# Patient Record
Sex: FEMALE | Race: WHITE
[De-identification: names, ages, dates, MRNs, and addresses within clinical notes are randomized per-mention and may not be internally consistent; named-entity substitution may affect disease eponyms.]

---

## 2021-07-28 ENCOUNTER — HOSPITAL ENCOUNTER (OUTPATIENT)
Dept: HOSPITAL 47 - LABWHC1 | Age: 31
Discharge: HOME | End: 2021-07-28
Attending: FAMILY MEDICINE
Payer: COMMERCIAL

## 2021-07-28 DIAGNOSIS — Z3A.00: ICD-10-CM

## 2021-07-28 DIAGNOSIS — O09.90: Primary | ICD-10-CM

## 2021-07-28 LAB
BASOPHILS # BLD AUTO: 0.04 X 10*3/UL (ref 0–0.1)
BASOPHILS NFR BLD AUTO: 0.3 %
EOSINOPHIL # BLD AUTO: 0.1 X 10*3/UL (ref 0.04–0.35)
EOSINOPHIL NFR BLD AUTO: 0.8 %
ERYTHROCYTE [DISTWIDTH] IN BLOOD BY AUTOMATED COUNT: 5 X 10*6/UL (ref 4.1–5.2)
ERYTHROCYTE [DISTWIDTH] IN BLOOD: 13.1 % (ref 11.5–14.5)
HCT VFR BLD AUTO: 44.8 % (ref 37.2–46.3)
HGB BLD-MCNC: 14.7 G/DL (ref 12–15)
LYMPHOCYTES # SPEC AUTO: 3.66 X 10*3/UL (ref 0.9–5)
LYMPHOCYTES NFR SPEC AUTO: 30.2 %
MCH RBC QN AUTO: 29.4 PG (ref 27–32)
MCHC RBC AUTO-ENTMCNC: 32.8 G/DL (ref 32–37)
MCV RBC AUTO: 89.6 FL (ref 80–97)
MONOCYTES # BLD AUTO: 0.76 X 10*3/UL (ref 0.2–1)
MONOCYTES NFR BLD AUTO: 6.3 %
NEUTROPHILS # BLD AUTO: 7.52 X 10*3/UL (ref 1.8–7.7)
NEUTROPHILS NFR BLD AUTO: 62.2 %
PLATELET # BLD AUTO: 256 X 10*3/UL (ref 140–440)
WBC # BLD AUTO: 12.11 X 10*3/UL (ref 4.5–10)

## 2021-07-28 PROCEDURE — 85025 COMPLETE CBC W/AUTO DIFF WBC: CPT

## 2021-07-28 PROCEDURE — 86592 SYPHILIS TEST NON-TREP QUAL: CPT

## 2021-07-28 PROCEDURE — 36415 COLL VENOUS BLD VENIPUNCTURE: CPT

## 2021-07-28 PROCEDURE — 84702 CHORIONIC GONADOTROPIN TEST: CPT

## 2021-07-28 PROCEDURE — 86780 TREPONEMA PALLIDUM: CPT

## 2021-07-28 PROCEDURE — 80053 COMPREHEN METABOLIC PANEL: CPT

## 2021-07-28 PROCEDURE — 87340 HEPATITIS B SURFACE AG IA: CPT

## 2021-07-29 LAB
ALBUMIN SERPL-MCNC: 4.5 G/DL (ref 3.8–4.9)
ALBUMIN/GLOB SERPL: 1.55 G/DL (ref 1.6–3.17)
ALP SERPL-CCNC: 63 U/L (ref 41–126)
ALT SERPL-CCNC: 26 U/L (ref 8–44)
ANION GAP SERPL CALC-SCNC: 12.6 MMOL/L (ref 4–12)
AST SERPL-CCNC: 21 U/L (ref 13–35)
BUN SERPL-SCNC: 11 MG/DL (ref 9–27)
BUN/CREAT SERPL: 15.71 RATIO (ref 12–20)
CALCIUM SPEC-MCNC: 9.6 MG/DL (ref 8.7–10.3)
CHLORIDE SERPL-SCNC: 104 MMOL/L (ref 96–109)
CO2 SERPL-SCNC: 20.4 MMOL/L (ref 21.6–31.8)
GLOBULIN SER CALC-MCNC: 2.9 G/DL (ref 1.6–3.3)
GLUCOSE SERPL-MCNC: 109 MG/DL (ref 70–110)
HCG SERPL-MCNC: (no result) MIU/ML
POTASSIUM SERPL-SCNC: 4.2 MMOL/L (ref 3.5–5.5)
PROT SERPL-MCNC: 7.4 G/DL (ref 6.2–8.2)
SODIUM SERPL-SCNC: 137 MMOL/L (ref 135–145)

## 2022-07-13 ENCOUNTER — HOSPITAL ENCOUNTER (EMERGENCY)
Dept: HOSPITAL 47 - EC | Age: 32
Discharge: HOME | End: 2022-07-13
Payer: COMMERCIAL

## 2022-07-13 VITALS
DIASTOLIC BLOOD PRESSURE: 66 MMHG | RESPIRATION RATE: 18 BRPM | TEMPERATURE: 98.2 F | SYSTOLIC BLOOD PRESSURE: 121 MMHG | HEART RATE: 75 BPM

## 2022-07-13 DIAGNOSIS — F11.20: Primary | ICD-10-CM

## 2022-07-13 DIAGNOSIS — F17.200: ICD-10-CM

## 2022-07-13 LAB
ANION GAP SERPL CALC-SCNC: 6 MMOL/L
BASOPHILS # BLD AUTO: 0 K/UL (ref 0–0.2)
BASOPHILS NFR BLD AUTO: 0 %
BUN SERPL-SCNC: 6 MG/DL (ref 7–17)
CALCIUM SPEC-MCNC: 8.8 MG/DL (ref 8.4–10.2)
CHLORIDE SERPL-SCNC: 102 MMOL/L (ref 98–107)
CO2 SERPL-SCNC: 26 MMOL/L (ref 22–30)
EOSINOPHIL # BLD AUTO: 0.1 K/UL (ref 0–0.7)
EOSINOPHIL NFR BLD AUTO: 2 %
ERYTHROCYTE [DISTWIDTH] IN BLOOD BY AUTOMATED COUNT: 3.77 M/UL (ref 3.8–5.4)
ERYTHROCYTE [DISTWIDTH] IN BLOOD: 12.7 % (ref 11.5–15.5)
GLUCOSE BLD-MCNC: 65 MG/DL (ref 70–110)
GLUCOSE BLD-MCNC: 91 MG/DL (ref 70–110)
GLUCOSE SERPL-MCNC: 69 MG/DL (ref 74–99)
HCT VFR BLD AUTO: 33.4 % (ref 34–46)
HGB BLD-MCNC: 11.4 GM/DL (ref 11.4–16)
LYMPHOCYTES # SPEC AUTO: 2.1 K/UL (ref 1–4.8)
LYMPHOCYTES NFR SPEC AUTO: 25 %
MCH RBC QN AUTO: 30.3 PG (ref 25–35)
MCHC RBC AUTO-ENTMCNC: 34.2 G/DL (ref 31–37)
MCV RBC AUTO: 88.6 FL (ref 80–100)
MONOCYTES # BLD AUTO: 0.4 K/UL (ref 0–1)
MONOCYTES NFR BLD AUTO: 5 %
NEUTROPHILS # BLD AUTO: 5.3 K/UL (ref 1.3–7.7)
NEUTROPHILS NFR BLD AUTO: 66 %
PLATELET # BLD AUTO: 213 K/UL (ref 150–450)
POTASSIUM SERPL-SCNC: 2.9 MMOL/L (ref 3.5–5.1)
SODIUM SERPL-SCNC: 134 MMOL/L (ref 137–145)
WBC # BLD AUTO: 8.2 K/UL (ref 3.8–10.6)

## 2022-07-13 PROCEDURE — 36415 COLL VENOUS BLD VENIPUNCTURE: CPT

## 2022-07-13 PROCEDURE — 72125 CT NECK SPINE W/O DYE: CPT

## 2022-07-13 PROCEDURE — 80306 DRUG TEST PRSMV INSTRMNT: CPT

## 2022-07-13 PROCEDURE — 76805 OB US >/= 14 WKS SNGL FETUS: CPT

## 2022-07-13 PROCEDURE — 80048 BASIC METABOLIC PNL TOTAL CA: CPT

## 2022-07-13 PROCEDURE — 85025 COMPLETE CBC W/AUTO DIFF WBC: CPT

## 2022-07-13 PROCEDURE — 93005 ELECTROCARDIOGRAM TRACING: CPT

## 2022-07-13 PROCEDURE — 96360 HYDRATION IV INFUSION INIT: CPT

## 2022-07-13 PROCEDURE — 99285 EMERGENCY DEPT VISIT HI MDM: CPT

## 2022-07-13 PROCEDURE — 70450 CT HEAD/BRAIN W/O DYE: CPT

## 2022-07-13 NOTE — ED
General Adult HPI





- General


Chief complaint: Fall


Stated complaint: altered mental status


Time Seen by Provider: 07/13/22 17:17


Source: patient


Mode of arrival: EMS





- History of Present Illness


Initial comments: 


Dictation was produced using dragon dictation software. please excuse any 

grammatical, word or spelling errors. 











Chief Complaint: Patient 32-year-old pregnant female presents emergency 

department for syncope fall and concerns of opiate toxicity





History of Present Illness: Is a 32-year-old female she was brought in by EMS 

from [facility].  Patient was trying to check in for polysubstance abuse.  

While being checked in she was noted to the very sleepy.  She fell and struck 

her head per EMS.  She is placed in a c-collar brought to the emergency room.  

Patient did admit to EMS that she used heroin and crack today.  She states that 

she's been sleepy ever since being pregnant.  She states she is 26 weeks.  She 

denies any complaints at this time.  Patient is a poor historian.  She sleepy at

the bedside.








The ROS documented in this emergency department record has been reviewed and 

confirmed by me.  Those systems with pertinent positive or negative responses 

have been documented in the HPI.  All other systems are other negative and/or no

ncontributory.








PHYSICAL EXAM:


General Impression: Alert and oriented x3, not in acute distress, sleepy but 

arousable with voice


HEENT: Normocephalic atraumatic, extra-ocular movements intact, pupils equal and

reactive to light bilaterally, mucous membranes moist.


Cardiovascular: Heart regular rate and rhythm


Chest: Able to complete full sentences, no retractions, no tachypnea


Abdomen: abdomen soft, non-tender, non-distended, no organomegaly, gravid


Musculoskeletal: Pulses present and equal in all extremities, no peripheral 

edema


Motor:  no focal deficits noted


Neurological: CN II-XII grossly intact, no focal motor or sensory deficits noted


Skin: Intact with no visualized rashes


Psych: Normal affect and mood





ED course: 31 yo  female who is allegedly 26 weeks pregnant presents after synco

pe and fall.  Patient does have physical exam features to suggest opiate 

toxicity.  She is sleepy however she is not respiratory depressed.  Signs upon 

arrival are within acceptable limits.  Patient does not have any signs of 

traumatic injuries.


Patient became agitated and belligerent at approximately 8:00 PM.  She did not 

appear to be showing signs of opiate toxidrome.  CBC unremarkable.  Metabolic 

panel shows mild hyperglycemia with the level of 69.  Rest of metabolic panel is

negative.  Patient given dextrose and oral intake.  Repeat blood glucose levels 

are normal.  Computed tomography scan of the brain and C-spine shows no acute 

processes.  Patient c-collar was cleared.





EKG interpretation: Ventricular rate, sinus rhythm,.  140, care is 113, QTc 450.

No ME prolongation, no QTC prolongation, no ST or T-wave changes noted.    

Overall, this EKG is unremarkable





Fetal ultrasound was obtained showing no acute processes.  There is a single 

live intrauterine pregnancy.  5 weeks and 5 days per ultrasound measurements.  

Heart rate is 126.  No obstetrical complications this ultrasound.





Patient related bedside 9:45 PM found to be stable medical condition.  Patient 

is awake alert and well-appearing.  She states she does have a obstetrician and 

takes prenatal vitamins.  Patient is urged not to use any illicit substances 

during this pregnancy because but may put baby and herself at risk.  Patient 

understands.  She is advised to follow-up with her obstetrician.  Patient told 

to continue taking prenatal vitamins.











- Related Data


                                Home Medications











 Medication  Instructions  Recorded  Confirmed


 


Acetaminophen [Tylenol 8 Hour] 650 mg PO Q6H PRN 07/13/22 07/13/22


 


Folagent Dha Supplement 1 cap PO DAILY 07/13/22 07/13/22


 


Zofran (Unknown Dose) 1 dose PO Q6H PRN 07/13/22 07/13/22











                                    Allergies











Allergy/AdvReac Type Severity Reaction Status Date / Time


 


No Known Allergies Allergy   Verified 07/13/22 17:54














Review of Systems


ROS Statement: 


Those systems with pertinent positive or pertinent negative responses have been 

documented in the HPI.





ROS Other: All systems not noted in ROS Statement are negative.





Past Medical History


Past Medical History: No Reported History


History of Any Multi-Drug Resistant Organisms: None Reported


Past Surgical History: No Surgical Hx Reported


Past Psychological History: No Psychological Hx Reported


Smoking Status: Current every day smoker


Past Alcohol Use History: None Reported


Past Drug Use History: Heroin, IV Drug Use





Course


                                   Vital Signs











  07/13/22 07/13/22





  17:11 18:56


 


Temperature 98.1 F 


 


Pulse Rate 78 67


 


Respiratory 18 14





Rate  


 


Blood Pressure 108/66 112/75


 


O2 Sat by Pulse 98 99





Oximetry  














Medical Decision Making





- Lab Data


Result diagrams: 


                                 07/13/22 18:23





                                 07/13/22 18:23


                                   Lab Results











  07/13/22 07/13/22 07/13/22 Range/Units





  18:23 18:23 18:23 


 


WBC  8.2    (3.8-10.6)  k/uL


 


RBC  3.77 L    (3.80-5.40)  m/uL


 


Hgb  11.4    (11.4-16.0)  gm/dL


 


Hct  33.4 L    (34.0-46.0)  %


 


MCV  88.6    (80.0-100.0)  fL


 


MCH  30.3    (25.0-35.0)  pg


 


MCHC  34.2    (31.0-37.0)  g/dL


 


RDW  12.7    (11.5-15.5)  %


 


Plt Count  213    (150-450)  k/uL


 


MPV  7.5    


 


Neutrophils %  66    %


 


Lymphocytes %  25    %


 


Monocytes %  5    %


 


Eosinophils %  2    %


 


Basophils %  0    %


 


Neutrophils #  5.3    (1.3-7.7)  k/uL


 


Lymphocytes #  2.1    (1.0-4.8)  k/uL


 


Monocytes #  0.4    (0-1.0)  k/uL


 


Eosinophils #  0.1    (0-0.7)  k/uL


 


Basophils #  0.0    (0-0.2)  k/uL


 


Sodium   134 L   (137-145)  mmol/L


 


Potassium   2.9 L   (3.5-5.1)  mmol/L


 


Chloride   102   ()  mmol/L


 


Carbon Dioxide   26   (22-30)  mmol/L


 


Anion Gap   6   mmol/L


 


BUN   6 L   (7-17)  mg/dL


 


Creatinine   0.52   (0.52-1.04)  mg/dL


 


Est GFR (CKD-EPI)AfAm   >90   (>60 ml/min/1.73 sqM)  


 


Est GFR (CKD-EPI)NonAf   >90   (>60 ml/min/1.73 sqM)  


 


Glucose   69 L   (74-99)  mg/dL


 


Calcium   8.8   (8.4-10.2)  mg/dL


 


Urine Opiates Screen    Detected H  (NotDetected)  


 


Ur Oxycodone Screen    Not Detected  (NotDetected)  


 


Urine Methadone Screen    Not Detected  (NotDetected)  


 


Ur Propoxyphene Screen    Not Detected  (NotDetected)  


 


Ur Barbiturates Screen    Not Detected  (NotDetected)  


 


U Tricyclic Antidepress    Not Detected  (NotDetected)  


 


Ur Phencyclidine Scrn    Not Detected  (NotDetected)  


 


Ur Amphetamines Screen    Not Detected  (NotDetected)  


 


U Methamphetamines Scrn    Not Detected  (NotDetected)  


 


U Benzodiazepines Scrn    Not Detected  (NotDetected)  


 


Urine Cocaine Screen    Detected H  (NotDetected)  


 


U Marijuana (THC) Screen    Not Detected  (NotDetected)  














Disposition


Clinical Impression: 


 Opiate dependence





Disposition: HOME SELF-CARE


Condition: Fair


Instructions (If sedation given, give patient instructions):  Opioid Use 

Disorder (ED), Pregnancy at 23 to 26 Weeks (ED)


Additional Instructions: 


Follow-up with the obstetrician.


Is patient prescribed a controlled substance at d/c from ED?: No


Time of Disposition: 21:43

## 2022-07-13 NOTE — US
EXAMINATION TYPE: US OB >= 14 wk fetus

 

DATE OF EXAM: 7/13/2022

 

COMPARISON: None

 

CLINICAL HISTORY: pregnant, fall patient had a fall and hit head. patient used heroin this morning. T
echnical limitations, patient uncooperative, unable to hold still or answer questions 

 

TECHNIQUE:  Transabdominal (TA)

 

GESTATIONAL AGE / DATING

Physician Established: Not yet established 

Dates by LMP: LMP unknown 

Dates by First Scan: No previous this is first scan 

Dates by Current Scan: (26 weeks/0 days) 

** EDC: 10/19/22 

 

 

FETAL SURVEY

IUP:  Single

PLACENTA: Anterior - heterogenous    

PREVIA:  No Previa

** HUAN: 13.8 cm Normal

CERVICAL LENGTH (transabdominal: norm > 3.0cm): 3.2 cm

 

FETAL BIOMETRY

PRESENTATION:  Vertex

FETAL LIE:  Longitudinal

BPD: 6.4 cm

**  26 weeks / 0 days

HC: 23.5 cm

**  25 weeks / 4 days

AC: 22.0 cm

**  26 weeks / 3 days

FL: 4.7 cm

**  25 weeks / 5 days

ESTIMATED FETAL WEIGHT IN GRAMS:  881 grams

ESTIMATED FETAL WEIGHT IN LBS/OZ:  1 lbs. 15 oz. 

HC/AC: 1.07

FL/AC: 21%

HEART RATE:  126 bpm 

RHYTHM:  Normal

 

IMPRESSION:

Single live intrauterine pregnancy. The liver. The liver and the stomach

## 2022-07-13 NOTE — CT
EXAMINATION TYPE: CT brain cspine wo con

CT DLP: 1442.7 mGycm, Automated exposure control for dose reduction was used.

 

DATE OF EXAM: 7/13/2022 6:55 PM

 

COMPARISON: None.. 

 

CLINICAL INDICATION:Female, 32 years old with history of syncope, fall, pregnant; Syncope, fall, 26 w
eeks pregnant, pt shielded

 

TECHNIQUE: 

Brain: Multiple axial CT images of the brain were obtained without IV contrast. 

Cspine: Axial CT images from the skull base to the inferior aspect of T2 we obtained without intraven
ous contrast. Coronal and sagittal reformatted images were also reviewed. 

 

FINDINGS:

 

Brain:

Extra-axial spaces: No abnormal extra-axial fluid collections.

Ventricular system: Within normal limits

Cerebral parenchyma: No acute intraparenchymal hemorrhage or mass effect.  The gray-white junction is
 well differentiated. 

Cerebellum: Unremarkable.

Mass effect: No evidence of midline shift.

Intracranial vasculature: unremarkable

Soft tissues: Normal.

Calvarium/osseous structures: No depressed skull fracture. Ossified Cephalhematoma noted on the left.


Paranasal sinuses and mastoid air cells: Mild scattered mucosal thickening and or secretions.

Visualized orbits: Orbital contents are intact.

 

Cervical spine:

Fracture: None.

Osseous structures: Unremarkable 

Vertebral alignment: Within normal limits.

Spinal canal/Neural Foramina: No evidence of significant spinal canal narrowing. No evidence for sign
ificant neural foraminal stenosis.

Neck soft tissues: Prevertebral soft tissues are within normal limits.

Other: The airway is patent. The lung apices are clear.

 

IMPRESSION:

1.  No acute intracranial process.

2.  No evidence of cervical spine fracture.

## 2022-07-14 ENCOUNTER — HOSPITAL ENCOUNTER (INPATIENT)
Dept: HOSPITAL 47 - EC | Age: 32
LOS: 4 days | Discharge: HOME | DRG: 831 | End: 2022-07-18
Attending: INTERNAL MEDICINE | Admitting: INTERNAL MEDICINE
Payer: COMMERCIAL

## 2022-07-14 DIAGNOSIS — F31.9: ICD-10-CM

## 2022-07-14 DIAGNOSIS — L02.412: ICD-10-CM

## 2022-07-14 DIAGNOSIS — O99.342: ICD-10-CM

## 2022-07-14 DIAGNOSIS — O99.282: ICD-10-CM

## 2022-07-14 DIAGNOSIS — N30.00: ICD-10-CM

## 2022-07-14 DIAGNOSIS — O99.712: ICD-10-CM

## 2022-07-14 DIAGNOSIS — G92.8: ICD-10-CM

## 2022-07-14 DIAGNOSIS — O99.353: ICD-10-CM

## 2022-07-14 DIAGNOSIS — O99.322: Primary | ICD-10-CM

## 2022-07-14 DIAGNOSIS — Z56.0: ICD-10-CM

## 2022-07-14 DIAGNOSIS — Z3A.26: ICD-10-CM

## 2022-07-14 DIAGNOSIS — Z79.899: ICD-10-CM

## 2022-07-14 DIAGNOSIS — E86.0: ICD-10-CM

## 2022-07-14 DIAGNOSIS — Z78.1: ICD-10-CM

## 2022-07-14 DIAGNOSIS — O23.42: ICD-10-CM

## 2022-07-14 DIAGNOSIS — F11.23: ICD-10-CM

## 2022-07-14 DIAGNOSIS — F14.10: ICD-10-CM

## 2022-07-14 DIAGNOSIS — F41.9: ICD-10-CM

## 2022-07-14 DIAGNOSIS — O99.332: ICD-10-CM

## 2022-07-14 DIAGNOSIS — F17.200: ICD-10-CM

## 2022-07-14 DIAGNOSIS — E87.6: ICD-10-CM

## 2022-07-14 LAB
ALBUMIN SERPL-MCNC: 3.4 G/DL (ref 3.5–5)
ALP SERPL-CCNC: 96 U/L (ref 38–126)
ALT SERPL-CCNC: 13 U/L (ref 4–34)
ANION GAP SERPL CALC-SCNC: 6 MMOL/L
AST SERPL-CCNC: 24 U/L (ref 14–36)
BASOPHILS # BLD AUTO: 0 K/UL (ref 0–0.2)
BASOPHILS NFR BLD AUTO: 0 %
BUN SERPL-SCNC: 5 MG/DL (ref 7–17)
CALCIUM SPEC-MCNC: 8.5 MG/DL (ref 8.4–10.2)
CHLORIDE SERPL-SCNC: 103 MMOL/L (ref 98–107)
CO2 BLDA-SCNC: 22 MMOL/L (ref 19–24)
CO2 SERPL-SCNC: 25 MMOL/L (ref 22–30)
EOSINOPHIL # BLD AUTO: 0 K/UL (ref 0–0.7)
EOSINOPHIL NFR BLD AUTO: 0 %
ERYTHROCYTE [DISTWIDTH] IN BLOOD BY AUTOMATED COUNT: 3.99 M/UL (ref 3.8–5.4)
ERYTHROCYTE [DISTWIDTH] IN BLOOD: 13.2 % (ref 11.5–15.5)
GLUCOSE SERPL-MCNC: 116 MG/DL (ref 74–99)
HCG SERPL-MCNC: (no result) MIU/ML
HCO3 BLDA-SCNC: 21 MMOL/L (ref 21–25)
HCT VFR BLD AUTO: 35.7 % (ref 34–46)
HGB BLD-MCNC: 12.4 GM/DL (ref 11.4–16)
KETONES UR QL STRIP.AUTO: (no result)
LYMPHOCYTES # SPEC AUTO: 1 K/UL (ref 1–4.8)
LYMPHOCYTES NFR SPEC AUTO: 10 %
MCH RBC QN AUTO: 31 PG (ref 25–35)
MCHC RBC AUTO-ENTMCNC: 34.6 G/DL (ref 31–37)
MCV RBC AUTO: 89.5 FL (ref 80–100)
MONOCYTES # BLD AUTO: 0.2 K/UL (ref 0–1)
MONOCYTES NFR BLD AUTO: 2 %
NEUTROPHILS # BLD AUTO: 8.4 K/UL (ref 1.3–7.7)
NEUTROPHILS NFR BLD AUTO: 87 %
PCO2 BLDA: 24 MMHG (ref 35–45)
PH BLDA: 7.55 [PH] (ref 7.35–7.45)
PH UR: 8 [PH] (ref 5–8)
PLATELET # BLD AUTO: 253 K/UL (ref 150–450)
PO2 BLDA: 100 MMHG (ref 83–108)
POTASSIUM SERPL-SCNC: 3 MMOL/L (ref 3.5–5.1)
PROT SERPL-MCNC: 6.5 G/DL (ref 6.3–8.2)
SODIUM SERPL-SCNC: 134 MMOL/L (ref 137–145)
SP GR UR: 1.01 (ref 1–1.03)
SQUAMOUS UR QL AUTO: 8 /HPF (ref 0–4)
UROBILINOGEN UR QL STRIP: 2 MG/DL (ref ?–2)
WBC # BLD AUTO: 9.6 K/UL (ref 3.8–10.6)
WBC # UR AUTO: 15 /HPF (ref 0–5)

## 2022-07-14 PROCEDURE — 70450 CT HEAD/BRAIN W/O DYE: CPT

## 2022-07-14 PROCEDURE — 87390 HIV-1 AG IA: CPT

## 2022-07-14 PROCEDURE — 81001 URINALYSIS AUTO W/SCOPE: CPT

## 2022-07-14 PROCEDURE — 86850 RBC ANTIBODY SCREEN: CPT

## 2022-07-14 PROCEDURE — 82805 BLOOD GASES W/O2 SATURATION: CPT

## 2022-07-14 PROCEDURE — 86140 C-REACTIVE PROTEIN: CPT

## 2022-07-14 PROCEDURE — 85652 RBC SED RATE AUTOMATED: CPT

## 2022-07-14 PROCEDURE — 86900 BLOOD TYPING SEROLOGIC ABO: CPT

## 2022-07-14 PROCEDURE — 86762 RUBELLA ANTIBODY: CPT

## 2022-07-14 PROCEDURE — 85025 COMPLETE CBC W/AUTO DIFF WBC: CPT

## 2022-07-14 PROCEDURE — 86780 TREPONEMA PALLIDUM: CPT

## 2022-07-14 PROCEDURE — 83735 ASSAY OF MAGNESIUM: CPT

## 2022-07-14 PROCEDURE — 86901 BLOOD TYPING SEROLOGIC RH(D): CPT

## 2022-07-14 PROCEDURE — 80053 COMPREHEN METABOLIC PANEL: CPT

## 2022-07-14 PROCEDURE — 82553 CREATINE MB FRACTION: CPT

## 2022-07-14 PROCEDURE — 36415 COLL VENOUS BLD VENIPUNCTURE: CPT

## 2022-07-14 PROCEDURE — 84145 PROCALCITONIN (PCT): CPT

## 2022-07-14 PROCEDURE — 96374 THER/PROPH/DIAG INJ IV PUSH: CPT

## 2022-07-14 PROCEDURE — 87340 HEPATITIS B SURFACE AG IA: CPT

## 2022-07-14 PROCEDURE — 82140 ASSAY OF AMMONIA: CPT

## 2022-07-14 PROCEDURE — 99285 EMERGENCY DEPT VISIT HI MDM: CPT

## 2022-07-14 PROCEDURE — 96361 HYDRATE IV INFUSION ADD-ON: CPT

## 2022-07-14 PROCEDURE — 84484 ASSAY OF TROPONIN QUANT: CPT

## 2022-07-14 PROCEDURE — 36600 WITHDRAWAL OF ARTERIAL BLOOD: CPT

## 2022-07-14 PROCEDURE — 84702 CHORIONIC GONADOTROPIN TEST: CPT

## 2022-07-14 PROCEDURE — 80306 DRUG TEST PRSMV INSTRMNT: CPT

## 2022-07-14 PROCEDURE — 87086 URINE CULTURE/COLONY COUNT: CPT

## 2022-07-14 PROCEDURE — 87040 BLOOD CULTURE FOR BACTERIA: CPT

## 2022-07-14 RX ADMIN — ONDANSETRON PRN MG: 2 INJECTION INTRAMUSCULAR; INTRAVENOUS at 23:03

## 2022-07-14 RX ADMIN — CEFAZOLIN SCH MLS/HR: 330 INJECTION, POWDER, FOR SOLUTION INTRAMUSCULAR; INTRAVENOUS at 23:04

## 2022-07-14 SDOH — ECONOMIC STABILITY - INCOME SECURITY: UNEMPLOYMENT, UNSPECIFIED: Z56.0

## 2022-07-14 NOTE — P.HPIM
History of Present Illness


H&P Date: 07/14/22


Chief Complaint: Altered mental status


32-year-old female who is 26 weeks pregnant, presents to emergency department 

with altered mental status.  She was seen in the ED yesterday when she was 

attempting to check herself into [place].  She had fallen yesterday and was

an altered mental state but eventually improved and was discharged from the ED 

in stable condition yesterday.  She was being treated as [place] for heroin

withdrawal but was brought back to the ED today as she was extremely lethargic 

with an unsteady gait.  It is unclear what occurred between her discharge from 

the ED yesterday and her presentation again today.  She denies taking any 

narcotics or illicit substances since then.  Her urine toxin today is positive 

for cocaine and opiates although this could be residual from her previous use.  

Patient appears to be disheveled today, she is lethargic, answering some 

questions but after multiple repetitions.  She had a CT of the head repeated ag

ain today which did not show any acute findings.  Blood work was fairly 

unremarkable other than a potassium level of 3.0.  She was also actively 

vomiting in the ED upon arrival but appeared to be yellow/brown fluid.  She 

received IV fluids, Reglan, oral potassium.  After observing her for a few hours

in the ED without any improvement, decision was made to admit the patient for 

closer observation as she is unsafe to return to [place].  An ABG and 

ammonia level was ordered in the ED and is still pending at this time








Review of Systems


Constitutional: Reports as per HPI





Past Medical History


Past Medical History: No Reported History


History of Any Multi-Drug Resistant Organisms: None Reported


Past Surgical History: No Surgical Hx Reported


Past Psychological History: No Psychological Hx Reported


Smoking Status: Current every day smoker


Past Alcohol Use History: None Reported


Past Drug Use History: Heroin, IV Drug Use





Medications and Allergies


                                Home Medications











 Medication  Instructions  Recorded  Confirmed  Type


 


Acetaminophen [Tylenol 8 Hour] 650 mg PO Q6H PRN 07/13/22 07/14/22 History


 


Folagent Dha Supplement 1 cap PO DAILY 07/13/22 07/14/22 History


 


Zofran (Unknown Dose) 1 dose PO Q6H PRN 07/13/22 07/14/22 History








                                    Allergies











Allergy/AdvReac Type Severity Reaction Status Date / Time


 


No Known Allergies Allergy   Verified 07/14/22 12:09














Physical Exam


Osteopathic Statement: *.  No significant issues noted on an osteopathic str

uctural exam other than those noted in the History and Physical/Consult.


Vitals: 


                                   Vital Signs











  Temp Pulse Resp BP Pulse Ox


 


 07/14/22 13:12   56 L  16  111/64  100


 


 07/14/22 11:29  97.9 F  51 L  16  111/66  99








                                Intake and Output











 07/14/22 07/14/22 07/14/22





 06:59 14:59 22:59


 


Other:   


 


  Weight  72.575 kg 














- Constitutional


General appearance: disheveled, mild distress





- EENT


Eyes: EOMI, PERRLA





- Neck


Neck: no lymphadenopathy, normal ROM, no rigidity





- Respiratory


Respiratory: bilateral: CTA, negative: rhonchi, wheezing





- Cardiovascular


Rhythm: regular


Abnormal Heart Sounds: no systolic murmur, no diastolic murmur





- Gastrointestinal


General gastrointestinal: decreased bowel sounds, distended, no tenderness





- Integumentary


Integumentary: decreased turgor, no rash, ulcer





- Neurologic


Patient is restless, awake, eyes are open, but does not consistently track or 

answer questions.  He questions a repeated, she does respond but any difficult 

to understand manner.  She is moving all her extremities spontaneously, no 

seizures or tremors,





Neurologic: CNII-XII intact





- Musculoskeletal


Musculoskeletal: no generalized weakness





- Psychiatric


Awake and alert but confused, flat affect, speech is incoherent, poor insight 

and memory recall








Results


CBC & Chem 7: 


                                 07/14/22 12:15





                                 07/14/22 12:15


Labs: 


                  Abnormal Lab Results - Last 24 Hours (Table)











  07/14/22 07/14/22 07/14/22 Range/Units





  12:15 12:15 12:30 


 


Neutrophils #  8.4 H    (1.3-7.7)  k/uL


 


Sodium   134 L   (137-145)  mmol/L


 


Potassium   3.0 L   (3.5-5.1)  mmol/L


 


BUN   5 L   (7-17)  mg/dL


 


Creatinine   0.47 L   (0.52-1.04)  mg/dL


 


Glucose   116 H   (74-99)  mg/dL


 


Albumin   3.4 L   (3.5-5.0)  g/dL


 


Urine Appearance     (Clear)  


 


Urine Protein     (Negative)  


 


Urine Ketones     (Negative)  


 


Ur Leukocyte Esterase     (Negative)  


 


Urine WBC     (0-5)  /hpf


 


Ur Squamous Epith Cells     (0-4)  /hpf


 


Urine Mucus     (None)  /hpf


 


Urine Opiates Screen    Detected H  (NotDetected)  


 


Urine Cocaine Screen    Detected H  (NotDetected)  














  07/14/22 Range/Units





  12:30 


 


Neutrophils #   (1.3-7.7)  k/uL


 


Sodium   (137-145)  mmol/L


 


Potassium   (3.5-5.1)  mmol/L


 


BUN   (7-17)  mg/dL


 


Creatinine   (0.52-1.04)  mg/dL


 


Glucose   (74-99)  mg/dL


 


Albumin   (3.5-5.0)  g/dL


 


Urine Appearance  Cloudy H  (Clear)  


 


Urine Protein  Trace H  (Negative)  


 


Urine Ketones  4+ H  (Negative)  


 


Ur Leukocyte Esterase  Small H  (Negative)  


 


Urine WBC  15 H  (0-5)  /hpf


 


Ur Squamous Epith Cells  8 H  (0-4)  /hpf


 


Urine Mucus  Rare H  (None)  /hpf


 


Urine Opiates Screen   (NotDetected)  


 


Urine Cocaine Screen   (NotDetected)  














Assessment and Plan


Assessment: 


# Acute metabolic encephalopathy


-Patient has a known history of substance abuse including heroin and cocaine


-She was seen in the ED yesterday where she was discharged [place] to begin

treatment for heroin detoxification


-It is unclear if she has taken any other substances in the last 24 hours.  

Urine tox screen still positive for cocaine and opiates


-Head CT shows no acute findings


-Patient has no fever, no leukocytosis, no nuchal rigidity


-This may possibly be due to substance abuse or withdrawal


-ABG and ammonia level pending


-Continue to monitor, if mentation worsens then lumbar puncture, MRI, and 

neurology consult is warranted





# Hypokalemia


-Potassium 3.0


-Most likely secondary to decreased oral intake


-Received oral potassium in the ED


-Check Magnesium level





# History of polysubstance abuse





#Pregnancy- 26 weeks


-Fetal ultrasound was unremarkable in the ED yesterday


-Patient further recommendations from obstetrics

## 2022-07-14 NOTE — ED
Altered Mental Status HPI





- General


Chief Complaint: Altered Mental Status


Stated Complaint: AMS


Time Seen by Provider: 07/14/22 11:37


Source: EMS, RN notes reviewed


Mode of arrival: EMS


Limitations: altered mental status





- History of Present Illness


Initial Comments: 


This is a 32-year-old female who presents to the emergency department for 

altered mental status.  The patient was evaluated in the emergency department 

yesterday, after attempting to check herself into [facility], and subsequently

having a fall due to her lethargy and altered mental status secondary to 

polysubstance abuse.  Her symptoms improved and she was subsequently discharged 

and returned to [facility]. She is being treated at [facility] for opioid 

detox, specifically heroin.  She was sent back to the emergency department 

today, as [facility] noted that the patient was acting extremely lethargic 

with unsteady gait.  She is also complaining of intermittent nausea and vomiting

with yellow/brown bile.  It is unclear what happened between the period of the 

patient's discharge yesterday and early this morning that caused the abrupt 

change in symptoms.  She is also noted to be 26 weeks pregnant and had an 

unremarkable fetal ultrasound in the emergency department yesterday.  The 

patient did say that she was receiving prenatal care yesterday, however it is 

not clear if the patient was being truthful or where she is getting care.  Upon 

examination the patient is very lethargic and unable to produce audible words.  

She is also actively vomiting.





Denies any fevers, chills, sore throat, cough, dyspnea, chest pain, 

palpitations, abdominal pain, diarrhea, back pain, or headaches.





MD Complaint: altered mental status, decreased responsiveness


Context: drug abuse, history of similar presentation


Associated Symptoms: nausea/vomiting





- Related Data


                                Home Medications











 Medication  Instructions  Recorded  Confirmed


 


Acetaminophen [Tylenol 8 Hour] 650 mg PO Q6H PRN 07/13/22 07/14/22


 


Folagent Dha Supplement 1 cap PO DAILY 07/13/22 07/14/22


 


Zofran (Unknown Dose) 1 dose PO Q6H PRN 07/13/22 07/14/22











                                    Allergies











Allergy/AdvReac Type Severity Reaction Status Date / Time


 


No Known Allergies Allergy   Verified 07/14/22 12:09














Review of Systems


ROS Statement: 


Those systems with pertinent positive or pertinent negative responses have been 

documented in the HPI.





ROS Other: All systems not noted in ROS Statement are negative.





Past Medical History


Past Medical History: No Reported History


History of Any Multi-Drug Resistant Organisms: None Reported


Past Surgical History: No Surgical Hx Reported


Past Psychological History: No Psychological Hx Reported


Smoking Status: Current every day smoker


Past Alcohol Use History: None Reported


Past Drug Use History: Heroin, IV Drug Use





General Exam


Limitations: altered mental status


General appearance: lethargic


Head exam: Present: atraumatic, normocephalic, normal inspection


Eye exam: Present: normal appearance, PERRL, EOMI.  Absent: scleral icterus, 

conjunctival injection, periorbital swelling


Respiratory exam: Present: normal lung sounds bilaterally.  Absent: respiratory 

distress, wheezes, rales, rhonchi, stridor


Cardiovascular Exam: Present: regular rate, normal rhythm, normal heart sounds. 

Absent: systolic murmur, diastolic murmur, rubs, gallop, clicks


Skin exam: Present: warm, dry, intact, normal color.  Absent: rash





Course


                                   Vital Signs











  07/14/22 07/14/22





  11:29 13:12


 


Temperature 97.9 F 


 


Pulse Rate 51 L 56 L


 


Respiratory 16 16





Rate  


 


Blood Pressure 111/66 111/64


 


O2 Sat by Pulse 99 100





Oximetry  














Medical Decision Making





- Medical Decision Making


This is a 32-year-old female who presents to the emergency department for 

altered mental status.  She is currently a patient at [facility] for opioid 

detox.  Patient's lab work was consistent with dehydration, she was given a 

liter bolus of normal saline as well as Reglan for the nausea.  Patient 

continues to be confused and disoriented, she is very lethargic and unable to 

put together words very clearly.  She is aware that she is in [place], but 

cannot name the hospital.  Patient noted to have hypokalemia, which was subsequ

ently treated with 20 mEq of K-dur.  I did try to contact [facility] multiple 

times for a more thorough history, however I was unable to reach them.  The 

patient was noted to be alert and oriented following discharge yesterday.  It is

unclear what happened between that time frame and this morning to cause her 

lethargy and delirium.  She declines any substance use between that time frame. 

Given the persistent symptoms, repeat computed tomography scan of the brain was 

obtained, this revealed no acute irregularities.  OB did auscultate fetal heart 

tones and found them to be within normal limits.  Actual values will be recorded

in the chart, but are currently pending.  Given that the patient continues to be

lethargic and delirious, will plan to admit.  [facility] in this condition. Ammonia level and ABG pending. Repeat EKG will be 

needed, there was difficulty in obtaining an accurate first one with the 

patient's positioning. 





This case was discussed in detail with the attending ED physician. Presentation,

findings, and treatment plan discussed in detail as well. 








- Lab Data


Result diagrams: 


                                 07/14/22 12:15





                                 07/14/22 12:15


                                   Lab Results











  07/14/22 07/14/22 07/14/22 Range/Units





  12:15 12:15 12:15 


 


WBC  9.6    (3.8-10.6)  k/uL


 


RBC  3.99    (3.80-5.40)  m/uL


 


Hgb  12.4    (11.4-16.0)  gm/dL


 


Hct  35.7    (34.0-46.0)  %


 


MCV  89.5    (80.0-100.0)  fL


 


MCH  31.0    (25.0-35.0)  pg


 


MCHC  34.6    (31.0-37.0)  g/dL


 


RDW  13.2    (11.5-15.5)  %


 


Plt Count  253    (150-450)  k/uL


 


MPV  7.7    


 


Neutrophils %  87    %


 


Lymphocytes %  10    %


 


Monocytes %  2    %


 


Eosinophils %  0    %


 


Basophils %  0    %


 


Neutrophils #  8.4 H    (1.3-7.7)  k/uL


 


Lymphocytes #  1.0    (1.0-4.8)  k/uL


 


Monocytes #  0.2    (0-1.0)  k/uL


 


Eosinophils #  0.0    (0-0.7)  k/uL


 


Basophils #  0.0    (0-0.2)  k/uL


 


Sodium   134 L   (137-145)  mmol/L


 


Potassium   3.0 L   (3.5-5.1)  mmol/L


 


Chloride   103   ()  mmol/L


 


Carbon Dioxide   25   (22-30)  mmol/L


 


Anion Gap   6   mmol/L


 


BUN   5 L   (7-17)  mg/dL


 


Creatinine   0.47 L   (0.52-1.04)  mg/dL


 


Est GFR (CKD-EPI)AfAm   >90   (>60 ml/min/1.73 sqM)  


 


Est GFR (CKD-EPI)NonAf   >90   (>60 ml/min/1.73 sqM)  


 


Glucose   116 H   (74-99)  mg/dL


 


Calcium   8.5   (8.4-10.2)  mg/dL


 


Total Bilirubin   0.4   (0.2-1.3)  mg/dL


 


AST   24   (14-36)  U/L


 


ALT   13   (4-34)  U/L


 


Alkaline Phosphatase   96   ()  U/L


 


Troponin I    <0.012  (0.000-0.034)  ng/mL


 


Total Protein   6.5   (6.3-8.2)  g/dL


 


Albumin   3.4 L   (3.5-5.0)  g/dL


 


HCG, Quant   32670.1   mIU/mL


 


Urine Color     


 


Urine Appearance     (Clear)  


 


Urine pH     (5.0-8.0)  


 


Ur Specific Gravity     (1.001-1.035)  


 


Urine Protein     (Negative)  


 


Urine Glucose (UA)     (Negative)  


 


Urine Ketones     (Negative)  


 


Urine Blood     (Negative)  


 


Urine Nitrite     (Negative)  


 


Urine Bilirubin     (Negative)  


 


Urine Urobilinogen     (<2.0)  mg/dL


 


Ur Leukocyte Esterase     (Negative)  


 


Urine WBC     (0-5)  /hpf


 


Ur Squamous Epith Cells     (0-4)  /hpf


 


Urine Mucus     (None)  /hpf


 


Urine Opiates Screen     (NotDetected)  


 


Ur Oxycodone Screen     (NotDetected)  


 


Urine Methadone Screen     (NotDetected)  


 


Ur Propoxyphene Screen     (NotDetected)  


 


Ur Barbiturates Screen     (NotDetected)  


 


U Tricyclic Antidepress     (NotDetected)  


 


Ur Phencyclidine Scrn     (NotDetected)  


 


Ur Amphetamines Screen     (NotDetected)  


 


U Methamphetamines Scrn     (NotDetected)  


 


U Benzodiazepines Scrn     (NotDetected)  


 


Urine Cocaine Screen     (NotDetected)  


 


U Marijuana (THC) Screen     (NotDetected)  














  07/14/22 07/14/22 Range/Units





  12:30 12:30 


 


WBC    (3.8-10.6)  k/uL


 


RBC    (3.80-5.40)  m/uL


 


Hgb    (11.4-16.0)  gm/dL


 


Hct    (34.0-46.0)  %


 


MCV    (80.0-100.0)  fL


 


MCH    (25.0-35.0)  pg


 


MCHC    (31.0-37.0)  g/dL


 


RDW    (11.5-15.5)  %


 


Plt Count    (150-450)  k/uL


 


MPV    


 


Neutrophils %    %


 


Lymphocytes %    %


 


Monocytes %    %


 


Eosinophils %    %


 


Basophils %    %


 


Neutrophils #    (1.3-7.7)  k/uL


 


Lymphocytes #    (1.0-4.8)  k/uL


 


Monocytes #    (0-1.0)  k/uL


 


Eosinophils #    (0-0.7)  k/uL


 


Basophils #    (0-0.2)  k/uL


 


Sodium    (137-145)  mmol/L


 


Potassium    (3.5-5.1)  mmol/L


 


Chloride    ()  mmol/L


 


Carbon Dioxide    (22-30)  mmol/L


 


Anion Gap    mmol/L


 


BUN    (7-17)  mg/dL


 


Creatinine    (0.52-1.04)  mg/dL


 


Est GFR (CKD-EPI)AfAm    (>60 ml/min/1.73 sqM)  


 


Est GFR (CKD-EPI)NonAf    (>60 ml/min/1.73 sqM)  


 


Glucose    (74-99)  mg/dL


 


Calcium    (8.4-10.2)  mg/dL


 


Total Bilirubin    (0.2-1.3)  mg/dL


 


AST    (14-36)  U/L


 


ALT    (4-34)  U/L


 


Alkaline Phosphatase    ()  U/L


 


Troponin I    (0.000-0.034)  ng/mL


 


Total Protein    (6.3-8.2)  g/dL


 


Albumin    (3.5-5.0)  g/dL


 


HCG, Quant    mIU/mL


 


Urine Color   Yellow  


 


Urine Appearance   Cloudy H  (Clear)  


 


Urine pH   8.0  (5.0-8.0)  


 


Ur Specific Gravity   1.013  (1.001-1.035)  


 


Urine Protein   Trace H  (Negative)  


 


Urine Glucose (UA)   Negative  (Negative)  


 


Urine Ketones   4+ H  (Negative)  


 


Urine Blood   Negative  (Negative)  


 


Urine Nitrite   Negative  (Negative)  


 


Urine Bilirubin   Negative  (Negative)  


 


Urine Urobilinogen   2.0  (<2.0)  mg/dL


 


Ur Leukocyte Esterase   Small H  (Negative)  


 


Urine WBC   15 H  (0-5)  /hpf


 


Ur Squamous Epith Cells   8 H  (0-4)  /hpf


 


Urine Mucus   Rare H  (None)  /hpf


 


Urine Opiates Screen  Detected H   (NotDetected)  


 


Ur Oxycodone Screen  Not Detected   (NotDetected)  


 


Urine Methadone Screen  Not Detected   (NotDetected)  


 


Ur Propoxyphene Screen  Not Detected   (NotDetected)  


 


Ur Barbiturates Screen  Not Detected   (NotDetected)  


 


U Tricyclic Antidepress  Not Detected   (NotDetected)  


 


Ur Phencyclidine Scrn  Not Detected   (NotDetected)  


 


Ur Amphetamines Screen  Not Detected   (NotDetected)  


 


U Methamphetamines Scrn  Not Detected   (NotDetected)  


 


U Benzodiazepines Scrn  Not Detected   (NotDetected)  


 


Urine Cocaine Screen  Detected H   (NotDetected)  


 


U Marijuana (THC) Screen  Not Detected   (NotDetected)  














- Radiology Data


Radiology results: report reviewed, image reviewed





Disposition


Clinical Impression: 


 Altered mental status, Opioid abuse with withdrawal





Disposition: ADMITTED AS IP TO THIS HOSP

## 2022-07-14 NOTE — CT
EXAMINATION TYPE: CT brain wo con

CT DLP: 1092.4 mGycm, Automated exposure control for dose reduction was used.

 

DATE OF EXAM: 7/14/2022 2:38 PM

 

COMPARISON: CT brain C-spine 7/13/2022 

 

CLINICAL INDICATION:Female, 32 years old with history of Altered mental status, ams

 

TECHNIQUE: 

Brain: Multiple axial CT images of the brain were obtained without IV contrast. 

 

FINDINGS:

 

Brain:

Extra-axial spaces: No abnormal extra-axial fluid collections.

Ventricular system: Within normal limits

Cerebral parenchyma: No acute intraparenchymal hemorrhage or mass effect.  The gray-white junction is
 well differentiated. 

Cerebellum: Unremarkable.

Mass effect: No evidence of midline shift.

Intracranial vasculature: unremarkable

Soft tissues: Normal.

Calvarium/osseous structures: No depressed skull fracture. Ossified cephalohematoma noted on the left
.

Paranasal sinuses and mastoid air cells: Mild scattered paranasal sinus disease. Mastoid air cells ar
e clear.

Visualized orbits: Orbital contents are intact.

 

IMPRESSION:

No acute intracranial process. No significant change from prior examination on 7/13/2022.

## 2022-07-15 LAB
ALBUMIN SERPL-MCNC: 3.2 G/DL (ref 3.5–5)
ALBUMIN/GLOB SERPL: 1.1 {RATIO}
ALP SERPL-CCNC: 83 U/L (ref 38–126)
ALT SERPL-CCNC: 13 U/L (ref 4–34)
ANION GAP SERPL CALC-SCNC: 7 MMOL/L
AST SERPL-CCNC: 21 U/L (ref 14–36)
BASOPHILS # BLD AUTO: 0 K/UL (ref 0–0.2)
BASOPHILS NFR BLD AUTO: 0 %
BUN SERPL-SCNC: 8 MG/DL (ref 7–17)
CALCIUM SPEC-MCNC: 8.2 MG/DL (ref 8.4–10.2)
CHLORIDE SERPL-SCNC: 109 MMOL/L (ref 98–107)
CO2 SERPL-SCNC: 20 MMOL/L (ref 22–30)
EOSINOPHIL # BLD AUTO: 0.1 K/UL (ref 0–0.7)
EOSINOPHIL NFR BLD AUTO: 1 %
ERYTHROCYTE [DISTWIDTH] IN BLOOD BY AUTOMATED COUNT: 3.94 M/UL (ref 3.8–5.4)
ERYTHROCYTE [DISTWIDTH] IN BLOOD: 13.2 % (ref 11.5–15.5)
GLOBULIN SER CALC-MCNC: 2.9 G/DL
GLUCOSE SERPL-MCNC: 104 MG/DL (ref 74–99)
HCT VFR BLD AUTO: 35.6 % (ref 34–46)
HGB BLD-MCNC: 12 GM/DL (ref 11.4–16)
KETONES UR QL STRIP.AUTO: (no result)
LYMPHOCYTES # SPEC AUTO: 1.3 K/UL (ref 1–4.8)
LYMPHOCYTES NFR SPEC AUTO: 11 %
MAGNESIUM SPEC-SCNC: 1.6 MG/DL (ref 1.6–2.3)
MCH RBC QN AUTO: 30.5 PG (ref 25–35)
MCHC RBC AUTO-ENTMCNC: 33.8 G/DL (ref 31–37)
MCV RBC AUTO: 90.4 FL (ref 80–100)
MONOCYTES # BLD AUTO: 0.5 K/UL (ref 0–1)
MONOCYTES NFR BLD AUTO: 4 %
NEUTROPHILS # BLD AUTO: 10.1 K/UL (ref 1.3–7.7)
NEUTROPHILS NFR BLD AUTO: 83 %
PH UR: 8 [PH] (ref 5–8)
PLATELET # BLD AUTO: 274 K/UL (ref 150–450)
POTASSIUM SERPL-SCNC: 2.9 MMOL/L (ref 3.5–5.1)
PROT SERPL-MCNC: 6.1 G/DL (ref 6.3–8.2)
RBC UR QL: 3 /HPF (ref 0–5)
SODIUM SERPL-SCNC: 136 MMOL/L (ref 137–145)
SP GR UR: 1.01 (ref 1–1.03)
SQUAMOUS UR QL AUTO: 5 /HPF (ref 0–4)
UROBILINOGEN UR QL STRIP: <2 MG/DL (ref ?–2)
WBC # BLD AUTO: 12.1 K/UL (ref 3.8–10.6)
WBC #/AREA URNS HPF: 22 /HPF (ref 0–5)

## 2022-07-15 RX ADMIN — MAGNESIUM SULFATE IN DEXTROSE SCH MLS/HR: 10 INJECTION, SOLUTION INTRAVENOUS at 11:04

## 2022-07-15 RX ADMIN — CEFAZOLIN SCH: 330 INJECTION, POWDER, FOR SOLUTION INTRAMUSCULAR; INTRAVENOUS at 12:28

## 2022-07-15 RX ADMIN — ONDANSETRON PRN MG: 2 INJECTION INTRAMUSCULAR; INTRAVENOUS at 13:57

## 2022-07-15 RX ADMIN — ASPIRIN SCH: 325 TABLET ORAL at 08:25

## 2022-07-15 RX ADMIN — MAGNESIUM SULFATE IN DEXTROSE SCH MLS/HR: 10 INJECTION, SOLUTION INTRAVENOUS at 09:32

## 2022-07-15 RX ADMIN — DEXTROSE MONOHYDRATE, SODIUM CHLORIDE, AND POTASSIUM CHLORIDE SCH MLS/HR: 50; 4.5; 2.98 INJECTION, SOLUTION INTRAVENOUS at 12:29

## 2022-07-15 NOTE — P.OBCN
History of Present Illness


Consult date: 07/15/22


Reason for consult: other (Pregnancy, 26 weeks)


Chief complaint: Opioid withdrawal


History of present illness: 





The patient is a 32-year-old  3 para  who presented to the ER 

initially yesterday for symptoms of opioid withdrawal.  She was treated and 

thought to be stable and then sent to [place] where she was admitted.  

Apparently, while at [place], she became significantly more lethargic and 

was sent back to the emergency room for acute evaluation as result.  See the 

notes from internal medicine and emergency room for further details.  

Incidentally, she is known to be 26 weeks pregnant.  She currently has received 

prenatal care somewhere near the [facility].  She reports normal fetal movement, no 

contractions, no vaginal concerns or complaints.  The patient and her family 

could not identify the name of her doctor to retrieve records though apparently 

ultrasound has been performed and dating is accurate, labs have been drawn.  The

patient is relatively lethargic and unable to contribute meaningfully to the 

interview.  The information is mostly provided by the patient's mother.





Obstetrical history:  3 para  with 2 term vaginal deliveries.  The 

patient does not have custody of either.  The first is with the child's father 

and the second is with the grandmother.





Gynecologic history: Noncontributory





Review of Systems





Review of systems is confined to history of present illness.





Past Medical History


Past Medical History: No Reported History


History of Any Multi-Drug Resistant Organisms: None Reported


Past Surgical History: No Surgical Hx Reported


Past Psychological History: No Psychological Hx Reported


Smoking Status: Current every day smoker


Past Alcohol Use History: None Reported


Past Drug Use History: Heroin, IV Drug Use





Medications and Allergies


                                Home Medications











 Medication  Instructions  Recorded  Confirmed  Type


 


Acetaminophen [Tylenol 8 Hour] 650 mg PO Q6H PRN 22 History


 


Folagent Dha Supplement 1 cap PO DAILY 22 History


 


Zofran (Unknown Dose) 1 dose PO Q6H PRN 22 History








                                    Allergies











Allergy/AdvReac Type Severity Reaction Status Date / Time


 


No Known Allergies Allergy   Verified 22 12:09














Exam


                                   Vital Signs











  Temp Pulse Pulse Resp BP BP Pulse Ox


 


 07/15/22 11:55  98.2 F   80  19   124/73  99


 


 07/15/22 09:31     24   


 


 07/15/22 04:14  98.2 F   73  18   133/82  99


 


 22 21:59  98.3 F   62  18   107/56  100


 


 22 21:29  97.9 F  86   16  118/68   100


 


 22 20:25   96   16  121/77   98


 


 22 18:29  98.8 F  102 H   18  119/98   96


 


 22 17:00   88   16  150/94   98


 


 22 16:00   78   16  117/77   96


 


 22 15:00   72   16  117/64   96








                                Intake and Output











 07/14/22 07/15/22 07/15/22





 22:59 06:59 14:59


 


Intake Total  1140 


 


Balance  1140 


 


Intake:   


 


  Intake, IV Titration  600 





  Amount   


 


    Sodium Chloride 0.9% 1,  600 





    000 ml @ 75 mls/hr IV .   





    [id number] [facility] Rx#:[id number]   


 


  Oral  540 


 


Other:   


 


  Voiding Method Toilet  


 


  # Voids  2 














In general, this is a somewhat confused and lethargic white female in no acute 

distress.  Further examination is not performed as it is not indicated from an 

obstetrical standpoint and has been performed by the internal medicine team.





Results


Result Diagrams: 


                                 07/15/22 08:39





                                 07/15/22 08:39


                  Abnormal Lab Results - Last 24 Hours (Table)











  07/14/22 07/15/22 07/15/22 Range/Units





  17:34 08:39 08:39 


 


WBC   12.1 H   (3.8-10.6)  k/uL


 


Neutrophils #   10.1 H   (1.3-7.7)  k/uL


 


ABG pH  7.55 H    (7.35-7.45)  


 


ABG pCO2  24 L    (35-45)  mmHg


 


ABG O2 Saturation  98.9 H    (94-97)  %


 


Sodium    136 L  (137-145)  mmol/L


 


Potassium    2.9 L  (3.5-5.1)  mmol/L


 


Chloride    109 H  ()  mmol/L


 


Carbon Dioxide    20 L  (22-30)  mmol/L


 


Creatinine    0.40 L  (0.52-1.04)  mg/dL


 


Glucose    104 H  (74-99)  mg/dL


 


Calcium    8.2 L  (8.4-10.2)  mg/dL


 


Total Protein    6.1 L  (6.3-8.2)  g/dL


 


Albumin    3.2 L  (3.5-5.0)  g/dL








                      Microbiology - Last 24 Hours (Table)











 22 12:30 Urine Culture - Preliminary





 Urine,Voided 














Assessment and Plan


(1) 26 weeks gestation of pregnancy


Current Visit: Yes   Status: Acute   Code(s): Z3A.26 - 26 WEEKS GESTATION OF 

PREGNANCY   SNOMED Code(s): 11771606


   





(2) Altered mental status


Current Visit: Yes   Status: Acute   Code(s): R41.82 - ALTERED MENTAL STATUS, 

UNSPECIFIED   SNOMED Code(s): 942486549


   





(3) Opioid abuse with withdrawal


Current Visit: Yes   Status: Acute   Code(s): F11.13 - OPIOID ABUSE WITH 

WITHDRAWAL   SNOMED Code(s): 98733807


   


Plan: 





As the patient does not have any obstetrical data available here in our system 

and cannot identify at this time her doctor who has a record, I will repeat 

basic prenatal labs and have an ultrasound performed to confirm reasonable 

dating.  I will also order a fetal heart rate strip once daily to make certain 

that there is no decelerations or other concerns.  I do not anticipate a 

strictly reactive NST at 26 weeks of gestation.  Otherwise, I will follow in the

chart and allow internal medicine to take care of her primary needs.  Should 

anything obstetric become of concern, please feel free to contact me.

## 2022-07-15 NOTE — P.PN
Subjective


Progress Note Date: 07/15/22


Principal diagnosis: 





opiate overdose


opiate withdrawal


31 yo female admitted yesterday with altered mental status and positive opiate 

and cocaine on urine tox screen.  This morning she was found to be very 

lethargic and unresponsive by nursing staff, she received 0.4 mg of IV Narcan 

with significant improvement in her mentation.  Although she became slightly 

aggressive and agitated.  This lasted less than an hour before she calmed down 

again and got back into bed.  She was able to tell me that she takes methadone 

although she is unsure of the dose or when the last time was.  She denies any 

headaches, no fevers, no shortness of breath, no cough.  UA from admission 

yesterday is suggestive of acute cystitis although she is unable to give a 

reliable history regarding symptoms.  She was started on IV ceftriaxone today








Objective





- Vital Signs


Vital signs: 


                                   Vital Signs











Temp  98.2 F   07/15/22 11:55


 


Pulse  80   07/15/22 11:55


 


Resp  19   07/15/22 11:55


 


BP  124/73   07/15/22 11:55


 


Pulse Ox  99   07/15/22 11:55


 


FiO2      








                                 Intake & Output











 07/14/22 07/15/22 07/15/22





 18:59 06:59 18:59


 


Intake Total  1140 


 


Balance  1140 


 


Weight 72.575 kg  


 


Intake:   


 


  Intake, IV Titration  600 





  Amount   


 


    Sodium Chloride 0.9% 1,  600 





    000 ml @ 75 mls/hr IV .   





    [id number] [facility] Rx#:[id number]   


 


  Oral  540 


 


Other:   


 


  Voiding Method  Toilet 


 


  # Voids  2 














- Constitutional


General appearance: Present: average body habitus, disheveled, mild distress





- EENT


Eyes: Present: EOMI, PERRLA





- Respiratory


Respiratory: bilateral: CTA





- Cardiovascular


Rhythm: regular


Heart sounds: normal: S1, S2


Abnormal Heart Sounds: Absent: systolic murmur





- Gastrointestinal


General gastrointestinal: Present: distended, normal bowel sounds.  Absent: 

tenderness





- Integumentary


Integumentary: Present: ulcer.  Absent: cellulitis





- Neurologic


Neurologic: Present: CNII-XII intact.  Absent: focal deficits





- Musculoskeletal


Musculoskeletal: Present: strength equal bilaterally





- Labs


CBC & Chem 7: 


                                 07/15/22 08:39





                                 07/15/22 08:39


Labs: 


                  Abnormal Lab Results - Last 24 Hours (Table)











  07/14/22 07/15/22 07/15/22 Range/Units





  17:34 08:39 08:39 


 


WBC   12.1 H   (3.8-10.6)  k/uL


 


Neutrophils #   10.1 H   (1.3-7.7)  k/uL


 


ESR     (0-20)  mm/hr


 


ABG pH  7.55 H    (7.35-7.45)  


 


ABG pCO2  24 L    (35-45)  mmHg


 


ABG O2 Saturation  98.9 H    (94-97)  %


 


Sodium    136 L  (137-145)  mmol/L


 


Potassium    2.9 L  (3.5-5.1)  mmol/L


 


Chloride    109 H  ()  mmol/L


 


Carbon Dioxide    20 L  (22-30)  mmol/L


 


Creatinine    0.40 L  (0.52-1.04)  mg/dL


 


Glucose    104 H  (74-99)  mg/dL


 


Calcium    8.2 L  (8.4-10.2)  mg/dL


 


Total Protein    6.1 L  (6.3-8.2)  g/dL


 


Albumin    3.2 L  (3.5-5.0)  g/dL














  07/15/22 Range/Units





  11:08 


 


WBC   (3.8-10.6)  k/uL


 


Neutrophils #   (1.3-7.7)  k/uL


 


ESR  25 H  (0-20)  mm/hr


 


ABG pH   (7.35-7.45)  


 


ABG pCO2   (35-45)  mmHg


 


ABG O2 Saturation   (94-97)  %


 


Sodium   (137-145)  mmol/L


 


Potassium   (3.5-5.1)  mmol/L


 


Chloride   ()  mmol/L


 


Carbon Dioxide   (22-30)  mmol/L


 


Creatinine   (0.52-1.04)  mg/dL


 


Glucose   (74-99)  mg/dL


 


Calcium   (8.4-10.2)  mg/dL


 


Total Protein   (6.3-8.2)  g/dL


 


Albumin   (3.5-5.0)  g/dL








                      Microbiology - Last 24 Hours (Table)











 07/14/22 12:30 Urine Culture - Preliminary





 Urine,Voided 














Assessment and Plan


Plan: 





# Acute metabolic encephalopathy


-Improved today after receiving 1 dose of IV Narcan.  Patient admitted to using 

methadone


-Head CT shows no acute findings


-Patient has no fever, no leukocytosis, no nuchal rigidity





# Opiate  Withdrawal


-Patient has a long history of IV heroin use, and has recently been taking 

methadone


-Received IV Narcan this morning due to depressed mental state which may have 

precipitated withdrawal symptoms


-Continue to monitor and treat symptoms accordingly


-May consider restarting methadone once mentation has improved and dose 

confirmed





# Acute cystitis


-No signs of severe sepsis


-Started on IV ceftriaxone today


-Follow up on urine cultures





# Hypokalemia


-Potassium 2.7


-Most likely secondary to decreased oral intake


-Received IV and oral potassium





# History of polysubstance abuse


- consult





#Pregnancy- 26 weeks


-Fetal ultrasound was unremarkable in the ED yesterday


-Patient further recommendations from obstetrics


Time with Patient: Greater than 30

## 2022-07-16 LAB
ALBUMIN SERPL-MCNC: 3.3 G/DL (ref 3.8–4.9)
ALBUMIN/GLOB SERPL: 1.18 G/DL (ref 1.6–3.17)
ALP SERPL-CCNC: 74 U/L (ref 41–126)
ALT SERPL-CCNC: 12 U/L (ref 8–44)
ANION GAP SERPL CALC-SCNC: 11.4 MMOL/L (ref 10–18)
AST SERPL-CCNC: 19 U/L (ref 13–35)
BASOPHILS # BLD AUTO: 0.02 X 10*3/UL (ref 0–0.1)
BASOPHILS NFR BLD AUTO: 0.2 %
BUN SERPL-SCNC: 7.4 MG/DL (ref 9–27)
BUN/CREAT SERPL: 14.8 RATIO (ref 12–20)
CALCIUM SPEC-MCNC: 8.5 MG/DL (ref 8.7–10.3)
CHLORIDE SERPL-SCNC: 105 MMOL/L (ref 96–109)
CO2 SERPL-SCNC: 21.6 MMOL/L (ref 20–27.5)
EOSINOPHIL # BLD AUTO: 0.01 X 10*3/UL (ref 0.04–0.35)
EOSINOPHIL NFR BLD AUTO: 0.1 %
ERYTHROCYTE [DISTWIDTH] IN BLOOD BY AUTOMATED COUNT: 4.05 X 10*6/UL (ref 4.1–5.2)
ERYTHROCYTE [DISTWIDTH] IN BLOOD: 13 % (ref 11.5–14.5)
GLOBULIN SER CALC-MCNC: 2.8 G/DL (ref 1.6–3.3)
GLUCOSE SERPL-MCNC: 91 MG/DL (ref 70–110)
HCT VFR BLD AUTO: 35 % (ref 37.2–46.3)
HGB BLD-MCNC: 11.7 G/DL (ref 12–15)
IMM GRANULOCYTES BLD QL AUTO: 0.5 %
LYMPHOCYTES # SPEC AUTO: 1.87 X 10*3/UL (ref 0.9–5)
LYMPHOCYTES NFR SPEC AUTO: 15.9 %
MCH RBC QN AUTO: 28.9 PG (ref 27–32)
MCHC RBC AUTO-ENTMCNC: 33.4 G/DL (ref 32–37)
MCV RBC AUTO: 86.4 FL (ref 80–97)
MONOCYTES # BLD AUTO: 0.73 X 10*3/UL (ref 0.2–1)
MONOCYTES NFR BLD AUTO: 6.2 %
NEUTROPHILS # BLD AUTO: 9.04 X 10*3/UL (ref 1.8–7.7)
NEUTROPHILS NFR BLD AUTO: 77.1 %
NRBC BLD AUTO-RTO: 0 /100 WBCS (ref 0–0)
PLATELET # BLD AUTO: 298 X 10*3/UL (ref 140–440)
POTASSIUM SERPL-SCNC: 2.9 MMOL/L (ref 3.5–5.5)
PROT SERPL-MCNC: 6.1 G/DL (ref 6.2–8.2)
SODIUM SERPL-SCNC: 138 MMOL/L (ref 135–145)
WBC # BLD AUTO: 11.73 X 10*3/UL (ref 4.5–10)

## 2022-07-16 RX ADMIN — DEXTROSE MONOHYDRATE, SODIUM CHLORIDE, AND POTASSIUM CHLORIDE SCH: 50; 4.5; 2.98 INJECTION, SOLUTION INTRAVENOUS at 07:55

## 2022-07-16 RX ADMIN — POTASSIUM CHLORIDE SCH MEQ: 20 TABLET, EXTENDED RELEASE ORAL at 12:33

## 2022-07-16 RX ADMIN — ONDANSETRON PRN MG: 2 INJECTION INTRAMUSCULAR; INTRAVENOUS at 10:48

## 2022-07-16 RX ADMIN — POTASSIUM CHLORIDE SCH MEQ: 20 TABLET, EXTENDED RELEASE ORAL at 13:58

## 2022-07-16 RX ADMIN — DEXTROSE MONOHYDRATE, SODIUM CHLORIDE, AND POTASSIUM CHLORIDE SCH: 50; 4.5; 2.98 INJECTION, SOLUTION INTRAVENOUS at 07:57

## 2022-07-16 RX ADMIN — ONDANSETRON PRN MG: 4 TABLET, ORALLY DISINTEGRATING ORAL at 21:10

## 2022-07-16 RX ADMIN — POTASSIUM CHLORIDE SCH MEQ: 20 TABLET, EXTENDED RELEASE ORAL at 11:51

## 2022-07-16 RX ADMIN — POTASSIUM CHLORIDE SCH: 20 TABLET, EXTENDED RELEASE ORAL at 16:42

## 2022-07-16 RX ADMIN — DEXTROSE MONOHYDRATE, SODIUM CHLORIDE, AND POTASSIUM CHLORIDE SCH: 50; 4.5; 2.98 INJECTION, SOLUTION INTRAVENOUS at 16:43

## 2022-07-16 RX ADMIN — ASPIRIN SCH: 325 TABLET ORAL at 07:58
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
## 2022-07-17 VITALS — RESPIRATION RATE: 18 BRPM

## 2022-07-17 RX ADMIN — Medication SCH EACH: at 07:56

## 2022-07-17 RX ADMIN — POTASSIUM CHLORIDE SCH MEQ: 20 TABLET, EXTENDED RELEASE ORAL at 07:56

## 2022-07-17 RX ADMIN — DEXTROSE MONOHYDRATE, SODIUM CHLORIDE, AND POTASSIUM CHLORIDE SCH: 50; 4.5; 2.98 INJECTION, SOLUTION INTRAVENOUS at 20:38

## 2022-07-17 RX ADMIN — CEPHALEXIN SCH MG: 500 CAPSULE ORAL at 20:38

## 2022-07-17 RX ADMIN — DEXTROSE MONOHYDRATE, SODIUM CHLORIDE, AND POTASSIUM CHLORIDE SCH: 50; 4.5; 2.98 INJECTION, SOLUTION INTRAVENOUS at 07:57

## 2022-07-17 RX ADMIN — DEXTROSE MONOHYDRATE, SODIUM CHLORIDE, AND POTASSIUM CHLORIDE SCH: 50; 4.5; 2.98 INJECTION, SOLUTION INTRAVENOUS at 00:10

## 2022-07-17 NOTE — P.PN
Subjective


Progress Note Date: 22





Hospital course


32-year-old  3 para  who presented to the ER initially yesterday for 

symptoms of opioid withdrawal.  She was treated and thought to be stable and 

then sent to [facility] where she was admitted.  Apparently, while at [facility], she became significantly more lethargic and was sent back to the 

emergency room for acute evaluation  Incidentally, she is known to be 26 weeks 

pregnant.  She currently has received prenatal care somewhere near the [facility].  

She reports normal fetal movement, no contractions, no vaginal concerns or 

complaints. 





Subjective 


Patient seen and evaluated at bedside, today patient does not report any 

worsening of his breathing or report any new significant chest pain.  Patient 

remains in no acute distress.  Patient questions and concerns addressed at 

bedside, proper counseling done.  Plan discussed with nursing staff.





Objective


Physicial exam


General appearance: lethargic


Head exam: Present: atraumatic, normocephalic, normal inspection


Eye exam: Present: normal appearance, PERRL, EOMI.  Absent: scleral icterus, 

conjunctival injection, periorbital swelling


Respiratory exam: Present: normal lung sounds bilaterally.  Absent: respiratory 

distress, wheezes, rales, rhonchi, stridor


Cardiovascular Exam: Present: regular rate, normal rhythm, normal heart sounds. 

Absent: systolic murmur, diastolic murmur, rubs, gallop, clicks


Skin exam: Present: warm, dry, intact, normal color.  Absent: rash





Assessment and Plan





# Acute metabolic encephalopathy


-Patient has a known history of substance abuse including heroin and cocaine


-Initially presented with what appeared to be opiate overdose, improved with 

Narcan


-Head CT shows no acute findings


-Per pharmacy Suboxone shortage will give 1 dose of methadone today





# Opiate  Withdrawal


-Methadone today 1 dose, resume Suboxone from tomorrow


-Continue symptomatic management for withdrawal symptoms





# Hypokalemia


-Monitor electrolytes replace as needed





# Suspected UTI


-continue Ceftriaxone, can switch to Keflex 





# History of polysubstance abuse


-social work consult





#Pregnancy- 26 weeks


-Fetal ultrasound was unremarkable in the ED yesterday


-Patient further recommendations from obstetrics








Discharge plan: Likely back to [facility] next 1-2 days once patient 

clinically stable 











Objective





- Vital Signs


Vital signs: 


                                   Vital Signs











Temp  97.9 F   22 13:00


 


Pulse  77   22 13:00


 


Resp  16   22 13:00


 


BP  109/63   07/17/22 13:00


 


Pulse Ox  97   22 13:00


 


FiO2      








                                 Intake & Output











 22





 18:59 06:59 18:59


 


Intake Total 2400  


 


Balance 2400  


 


Intake:   


 


  Oral 2400  


 


Other:   


 


  Voiding Method Toilet Toilet Toilet


 


  # Voids 7 2 














- Labs


CBC & Chem 7: 


                                 22 05:57





                                 22 05:46


Labs: 


                      Microbiology - Last 24 Hours (Table)











 07/15/22 11:08 Blood Culture - Preliminary





 Blood    No Growth after 48 hours

## 2022-07-18 VITALS — HEART RATE: 69 BPM | TEMPERATURE: 97.9 F | SYSTOLIC BLOOD PRESSURE: 99 MMHG | DIASTOLIC BLOOD PRESSURE: 55 MMHG

## 2022-07-18 LAB
ALBUMIN SERPL-MCNC: 3.1 G/DL (ref 3.8–4.9)
ALBUMIN/GLOB SERPL: 1.24 G/DL (ref 1.6–3.17)
ALP SERPL-CCNC: 72 U/L (ref 41–126)
ALT SERPL-CCNC: 14 U/L (ref 8–44)
ANION GAP SERPL CALC-SCNC: 10.6 MMOL/L (ref 10–18)
AST SERPL-CCNC: 19 U/L (ref 13–35)
BASOPHILS # BLD AUTO: 0.02 X 10*3/UL (ref 0–0.1)
BASOPHILS NFR BLD AUTO: 0.2 %
BUN SERPL-SCNC: 3.7 MG/DL (ref 9–27)
BUN/CREAT SERPL: 7.4 RATIO (ref 12–20)
CALCIUM SPEC-MCNC: 8.3 MG/DL (ref 8.7–10.3)
CHLORIDE SERPL-SCNC: 104 MMOL/L (ref 96–109)
CO2 SERPL-SCNC: 21.4 MMOL/L (ref 20–27.5)
EOSINOPHIL # BLD AUTO: 0.08 X 10*3/UL (ref 0.04–0.35)
EOSINOPHIL NFR BLD AUTO: 0.9 %
ERYTHROCYTE [DISTWIDTH] IN BLOOD BY AUTOMATED COUNT: 3.79 X 10*6/UL (ref 4.1–5.2)
ERYTHROCYTE [DISTWIDTH] IN BLOOD: 13.1 % (ref 11.5–14.5)
GLOBULIN SER CALC-MCNC: 2.5 G/DL (ref 1.6–3.3)
GLUCOSE SERPL-MCNC: 80 MG/DL (ref 70–110)
HCT VFR BLD AUTO: 33.3 % (ref 37.2–46.3)
HGB BLD-MCNC: 11 G/DL (ref 12–15)
IMM GRANULOCYTES BLD QL AUTO: 0.8 %
LYMPHOCYTES # SPEC AUTO: 2.11 X 10*3/UL (ref 0.9–5)
LYMPHOCYTES NFR SPEC AUTO: 24.7 %
MCH RBC QN AUTO: 29 PG (ref 27–32)
MCHC RBC AUTO-ENTMCNC: 33 G/DL (ref 32–37)
MCV RBC AUTO: 87.9 FL (ref 80–97)
MONOCYTES # BLD AUTO: 0.58 X 10*3/UL (ref 0.2–1)
MONOCYTES NFR BLD AUTO: 6.8 %
NEUTROPHILS # BLD AUTO: 5.67 X 10*3/UL (ref 1.8–7.7)
NEUTROPHILS NFR BLD AUTO: 66.6 %
NRBC BLD AUTO-RTO: 0 /100 WBCS (ref 0–0)
PLATELET # BLD AUTO: 243 X 10*3/UL (ref 140–440)
POTASSIUM SERPL-SCNC: 3.4 MMOL/L (ref 3.5–5.5)
PROT SERPL-MCNC: 5.6 G/DL (ref 6.2–8.2)
SODIUM SERPL-SCNC: 136 MMOL/L (ref 135–145)
WBC # BLD AUTO: 8.53 X 10*3/UL (ref 4.5–10)

## 2022-07-18 RX ADMIN — POTASSIUM CHLORIDE SCH MEQ: 20 TABLET, EXTENDED RELEASE ORAL at 09:14

## 2022-07-18 RX ADMIN — DEXTROSE MONOHYDRATE, SODIUM CHLORIDE, AND POTASSIUM CHLORIDE SCH: 50; 4.5; 2.98 INJECTION, SOLUTION INTRAVENOUS at 07:45

## 2022-07-18 RX ADMIN — Medication SCH EACH: at 09:14

## 2022-07-18 RX ADMIN — CEPHALEXIN SCH MG: 500 CAPSULE ORAL at 09:13

## 2022-07-18 RX ADMIN — ONDANSETRON PRN MG: 4 TABLET, ORALLY DISINTEGRATING ORAL at 03:57

## 2022-07-18 NOTE — P.DS
Providers


Date of admission: 


07/15/22 12:25





Expected date of discharge: 07/18/22


Attending physician: 


Melanie Odell MD





Consults: 





                                        





07/14/22 16:24


Consult Physician Urgent 


   Consulting Provider: Marquita Lala


   Consult Reason/Comments: 26 weeks pregnant, opioid withdrawal delirium


   Do you want consulting provider notified?: Yes





07/16/22 03:14


Consult Physician Routine 


   Consulting Provider: Ward Woodall


   Consult Reason/Comments: Petition, aggressive behavior, harm to others.


   Do you want consulting provider notified?: Yes, Notify in am





07/17/22 14:17


Consult Physician Routine 


   Consulting Provider: Khalida Almodovar


   Consult Reason/Comments: Left Axilla follicle abscess


   Do you want consulting provider notified?: Yes











Primary care physician: 


Stated None





Hospital Course: 


Discharge Diagnosis:


Acute toxic metabolic encephalopathy,secondary to opiates as it improved with 

Narcan





Opiate withdrawal





Hypokalemia





Urinary tract infection





Intrauterine pregnancy, 26 weeks





Hospital Course: 


Patient is a 32-year-old female with history of opiate drug abuse who is 26 

weeks pregnant and presented to the emergency department with altered mentation.

 She had been at [facility] for treatment of heroin withdrawal who came back 

to the ER from there to being lethargic with an unsteady gait.  She is admitted 

for further monitoring.  Head CT demonstrated no acute process.  She was found 

have severe hypokalemia.  She admitted having some nausea and vomiting.  She was

started on potassium supplementation and admitted for further monitoring.  She 

was seen by OB/GYN.  She did have an ultrasound on her prior year admission 

which confirmed a viable 26 week pregnancy.  Her lethargy resolved throughout 

her hospital stay.  She was seen by psychiatry who determined returning to 

[facility] was the most appropriate process.  Her potassium levels stabilized.

 She was determined stable for discharge to [facility].  She was also found to

have bacteriuria and will complete a seven-day course of antibiotics.  She will 

have 5 additional days of antibiotics as well as 3 additional days of potassium 

and scripts were sent to [facility].  She will need to follow-up with her primary

OB/GYN Dr. Gan for the remiander of her pregnancy. 








Patient seen and examined at bedside. Deneis pain, doing well, wants to go back 

to [facility]. 





Vital signs reviewed and stable. 


General: nontoxic, no distress, appears at stated age


Derm: warm, dry


Head: atraumatic, normocephalic, symmetric


Eyes: EOMI, no lid lag, anicteric sclera


Mouth: no lip lesion, mucus membranes moist


Cardiovascular: S1S2 reg, no murmur, positive posterior tibial pulse bilateral, 


Lungs: CTA bilateral, no rhonchi, no rales , no accessory muscle use


Abdominal: soft,  nontender to palpation, no guarding, no appreciable 

organomegaly


Ext: no gross muscle atrophy, no edema, no contractures


Neuro:  CN II-XI grossly intact, no focal neuro deficits


Psych: Alert, oriented, appropriate affect 








A total of 37 minutes of time were spent preparing this complex discharge 

summary.


Patient was discharged on 7/18/22. 





Patient Condition at Discharge: Stable





Plan - Discharge Summary


New Discharge Prescriptions: 


New


   Potassium Chloride ER [K-Dur 20] 40 meq PO DAILY #3 tab


   Cephalexin [Keflex] 500 mg PO BID #10 cap





Continue


   Acetaminophen [Tylenol 8 Hour] 650 mg PO Q6H PRN


     PRN Reason: Pain Or Fever > 100.5


   Folagent Dha Supplement 1 cap PO DAILY


   Zofran (Unknown Dose) 1 dose PO Q6H PRN


     PRN Reason: Nausea


Discharge Medication List





Acetaminophen [Tylenol 8 Hour] 650 mg PO Q6H PRN 07/13/22 [History]


Folagent Dha Supplement 1 cap PO DAILY 07/13/22 [History]


Zofran (Unknown Dose) 1 dose PO Q6H PRN 07/13/22 [History]


Cephalexin [Keflex] 500 mg PO BID #10 cap 07/18/22 [Rx]


Potassium Chloride ER [K-Dur 20] 40 meq PO DAILY #3 tab 07/18/22 [Rx]








Follow up Appointment(s)/Referral(s): 


[facility]


Daniele Gan MD [STAFF PHYSICIAN] - 1 Week (patient to make follow up appt)


None,Stated [Primary Care Provider] - 1-2 days


Patient Instructions/Handouts:  Cephalexin (By mouth), Potassium Chloride (By 

mouth)


Activity/Diet/Wound Care/Special Instructions: 


Activity:


as tolerated 





Diet: 


regular, pregnancy related restrictions 





Special Instructions: 


Abstain from illicit substances

## 2022-07-18 NOTE — P.GSCN
History of Present Illness


Consult date: 07/18/22


History of present illness: 





CHIEF COMPLAINT: Altered mental status changes





HISTORY OF PRESENT ILLNESS: This is a 32-year-old female presented to the 

hospital with altered mental status changes and lethargy.  She is 36 weeks 

pregnant.  Patient has been at [facility] for treatment of heroin withdrawal. 

Patient's mentation improved after Narcan given.  Patient was found to have 

hypokalemia.  She also was found to have an abscess in the left axilla area.  

She was started on antibiotics.  Surgical consult requested for evaluation of 

the left axilla abscess.  Patient reports that she has had drainage from this 

abscess.  She reports she's had prior abscesses.  She denies any fever, chills 

or sweats.  Patient is tolerating diet.  Her white count has normalized.  FELY

irasema reports that she is scheduled for discharge later today.





PAST MEDICAL HISTORY: 


See list.





PAST SURGICAL HISTORY: 


See list.





MEDICATIONS: 


See list.





ALLERGIES: 


See list.





SOCIAL HISTORY: No illicit drug use.  





REVIEW OF SYSTEMS: 


CONSTITUTIONAL: Denies fever or chills.


HEENT: Denies blurred vision, vision changes, or eye pain. Denies hemoptysis 


ENDOCRINE: Denies heat or cold intolerance.


CARDIOVASCULAR: Denies chest pain or pressure.


RESPIRATORY: No shortness of breath. 


GASTROINTESTINAL: Denies abdominal pain. Denies nausea or vomiting.


NEURO: Denies history of seizures.


PSYCH: No depression or suicidal ideation


HEMATOLOGIC: Denies bleeding disorders.


LYMPHATIC:  The patient denies any lumps and bumps around the neck. 


GENITOURINARY:  Denies any blood in urine or increased urinary frequency.  


MUSCULOSKELETAL:  Denies myalgias. Denies joint swelling. Denies decreased range

of motion beyond patients baseline.


SKIN: Denies pruitis. Denies rash.





PHYSICAL EXAM: 


VITAL SIGNS: Reviewed


GENERAL: Well-developed in no acute distress. 


HEENT:  No sclera icterus. Extraocular movements grossly intact.  Moist buccal 

mucosa. 


Head is atraumatic, normocephalic. Hears conversational speech. No nasal 

drainage.


NECK:  Supple without lymphadenopathy.


CHEST:  Non-labored respirations and equal bilateral excursions. 


CARDIOVASCULAR:  Palpable 2+ radial pulses.


ABDOMEN:  Soft.  Nondistended. Nontender


MUSCULOSKELETAL:  No clubbing or cyanosis.


NEUROLOGIC:  No focal or lateralizing signs.  Cranial nerves II through XII 

grossly intact.


PSYCH:  Appropriate affect.  Alert and oriented to person, place and time.


SKIN: Well perfused.  Good skin turgor. 


Extremities:.  Left axilla 1 cm abscess.  no drainage noted.  Minimal erythema. 

Soft.  Nontender.





LABORATORY DATA:


WBC 12.1 down to 8.53 Hgb is 11.0 platelets 243 sodium 136 potassium is 3.4 

creatinine 0.5





IMAGING:











ASSESSMENT: 


1.  Left axilla abscess with spontaneous drainage


2.  History of IV drug use


3.  26 weeks pregnant


4.  Hypokalemia





PLAN: 


-Apply warm compresses to left axilla abscess


-Encouraged patient to shower daily


-Continue antibiotics per medicine service


-No surgical intervention planned


-Patient can be discharged from surgical standpoint when medically cleared 


-Potassium replacement per medicine service





Thank you for this consultation





Physician Assistant note has been reviewed by physician. Signing provider agrees

with the documented findings, assessment, and plan of care. 








Patient seen and evaluated.  She reports pre-existing history of abscesses along

the left axilla.  She reports drainage.  No need for surgical intervention.  

Conservative management with warm compresses.





Past Medical History


Past Medical History: No Reported History


History of Any Multi-Drug Resistant Organisms: None Reported


Past Surgical History: No Surgical Hx Reported


Past Psychological History: No Psychological Hx Reported


Smoking Status: Current every day smoker


Past Alcohol Use History: None Reported


Past Drug Use History: Heroin, IV Drug Use





Medications and Allergies


                                Home Medications











 Medication  Instructions  Recorded  Confirmed  Type


 


Acetaminophen [Tylenol 8 Hour] 650 mg PO Q6H PRN 07/13/22 07/14/22 History


 


Folagent Dha Supplement 1 cap PO DAILY 07/13/22 07/14/22 History


 


Zofran (Unknown Dose) 1 dose PO Q6H PRN 07/13/22 07/14/22 History


 


Cephalexin [Keflex] 500 mg PO BID #10 cap 07/18/22  Rx


 


Potassium Chloride ER [K-Dur 20] 40 meq PO DAILY #3 tab 07/18/22  Rx








                                    Allergies











Allergy/AdvReac Type Severity Reaction Status Date / Time


 


No Known Allergies Allergy   Verified 07/14/22 12:09














Surgical - Exam


                                   Vital Signs











Temp Pulse Resp BP Pulse Ox


 


 97.9 F   51 L  16   111/66   99 


 


 07/14/22 11:29  07/14/22 11:29  07/14/22 11:29  07/14/22 11:29  07/14/22 11:29














Results





- Labs





                                 07/18/22 05:45





                                 07/18/22 05:45


                      Microbiology - Last 24 Hours (Table)











 07/15/22 11:08 Blood Culture - Preliminary





 Blood    No Growth after 48 hours

## 2022-07-18 NOTE — P.PN
Progress Note - Text


Progress Note Date: 07/18/22


Interval History:


Patient was seen in follow-up to psychiatry consultation for "petition, 

aggressive behavior, harm to others". Per staff, patient has not had episodes of

agitation on Sunday or so far today. On my assessment, she is alert and oriented

to person, place, time and situation. She is calm and attempts to cooperative. 

At this time patient denies any suicidal or homical ideations, intent or plan. 

Patient denies any auditory, visual hallucinations and denies any paranoia or 

delusions. Patient denies any side effects from the medications and has been 

compliant with meds. She is focused on discharged and plans to return to [facility] for inpatient substance abuse treatment. 





Mental Status Exam:


General Appearance: Patient appears to be stated age, hygiene is improved.


Behavior: Patient is calmly lying in bed without any agitated behavior at this 

time. 


Speech: Patient's speech is coherent and non-pressured.


Mood/Affect: Mood is good, affect is congruent/euthymic.


Suicidality/Homicidality: Patient denies having any suicidal or homicidal 

ideation intent or plan.  


Perceptions: Patient denies any visual hallucinations and denies any auditory 

hallucinations.  


Though content/process: There is no evidence of any delusional thought content 

and thought process is linear and goal-directed. 


Memory and concentration: Alert and oriented to person, place, time and 

situation. 


Judgment and insight: Chronically poor





Assessment


Unspecified episodic mood disorder


Rule out substance induced mood disorder


Bipolar Disorder by history


Opioid use disorder, severe


Opioid withdrawal - resolved


Tobacco use disorder





Plan:


-At this time, patient DOES NOT meet criteria for inpatient psychiatric 

admission.


-Medications: Continue current medications. No changes to medications at this 

time. 


-Agree with plan to discharge to [facility] for inpatient substance abuse 

treatment.


-She will need to follow-up with an outpatient psychiatrist and therapist, as 

well as continue outpatient substance abuse treatment (such as [facility]) after 

discharge from [facility].


-Psychiatry will sign-off at this time. 


-Please contact with any questions.

## 2022-11-25 ENCOUNTER — HOSPITAL ENCOUNTER (EMERGENCY)
Dept: HOSPITAL 47 - EC | Age: 32
Discharge: HOME | End: 2022-11-25
Payer: COMMERCIAL

## 2022-11-25 VITALS — HEART RATE: 80 BPM | SYSTOLIC BLOOD PRESSURE: 133 MMHG | RESPIRATION RATE: 16 BRPM | DIASTOLIC BLOOD PRESSURE: 79 MMHG

## 2022-11-25 VITALS — TEMPERATURE: 99 F

## 2022-11-25 DIAGNOSIS — G56.31: Primary | ICD-10-CM

## 2022-11-25 DIAGNOSIS — F17.200: ICD-10-CM

## 2022-11-25 PROCEDURE — 99283 EMERGENCY DEPT VISIT LOW MDM: CPT

## 2022-11-25 NOTE — XR
EXAMINATION TYPE: XR wrist complete RT

 

DATE OF EXAM: 11/25/2022

 

COMPARISON: NONE

 

HISTORY: Pain. Heroin injection

 

TECHNIQUE: 5 views

 

FINDINGS: There is no sign of fracture nor dislocation. No sign of foreign body. Joint spaces are nor
mal.

 

IMPRESSION: Normal right wrist exam.

## 2022-11-25 NOTE — XR
EXAMINATION TYPE: XR hand complete RT

 

DATE OF EXAM: 11/25/2022

 

COMPARISON: NONE

 

HISTORY: Heroin injection

 

TECHNIQUE: 3 views

 

FINDINGS: Metacarpals are intact. I see no fracture nor dislocation. No focal bone destruction. No ev
idence of a radiopaque foreign body.

 

IMPRESSION: No foreign body seen. No evidence of a fracture.

## 2022-11-25 NOTE — ED
General Adult HPI





- General


Chief complaint: Extremity Injury, Upper


Stated complaint: R wrist sore


Time Seen by Provider: 11/25/22 19:51


Source: patient, RN notes reviewed


Mode of arrival: ambulatory


Limitations: no limitations





- History of Present Illness


Initial comments: 





32-year-old female presents to the emergency Department with complaints of decr

eased range of motion in the right wrist and hand.  Patient states this has been

an ongoing issue for the past 3 weeks.  States she shot up with cocaine in that 

area 3 weeks ago and has not been able to use her hand since. States she is 

unable to grasp objects or use a pencil. She checked into [facility] today for

rehab; last use 0200 today.  Was sent for evaluation. Denies pain, swelling, 

redness, and loss of sensation.





- Related Data


                                Home Medications











 Medication  Instructions  Recorded  Confirmed


 


Acetaminophen [Tylenol 8 Hour] 650 mg PO Q6H PRN 07/13/22 07/14/22


 


Folagent Dha Supplement 1 cap PO DAILY 07/13/22 07/14/22


 


Zofran (Unknown Dose) 1 dose PO Q6H PRN 07/13/22 07/14/22








                                  Previous Rx's











 Medication  Instructions  Recorded


 


Cephalexin [Keflex] 500 mg PO BID #10 cap 07/18/22


 


Potassium Chloride ER [K-Dur 20] 40 meq PO DAILY #3 tab 07/18/22











                                    Allergies











Allergy/AdvReac Type Severity Reaction Status Date / Time


 


No Known Allergies Allergy   Verified 11/25/22 16:52














Review of Systems


ROS Statement: 


Those systems with pertinent positive or pertinent negative responses have been 

documented in the HPI.





ROS Other: All systems not noted in ROS Statement are negative.





Past Medical History


Past Medical History: No Reported History


History of Any Multi-Drug Resistant Organisms: None Reported


Past Surgical History: No Surgical Hx Reported


Past Psychological History: No Psychological Hx Reported


Smoking Status: Current every day smoker


Past Alcohol Use History: None Reported


Past Drug Use History: Heroin, IV Drug Use





General Exam


Limitations: no limitations


General appearance: alert, in no apparent distress


ENT exam: Present: normal oropharynx, mucous membranes moist


Respiratory exam: Present: normal lung sounds bilaterally.  Absent: respiratory 

distress, wheezes, rales, rhonchi, stridor


Cardiovascular Exam: Present: regular rate, normal rhythm, normal heart sounds. 

Absent: systolic murmur, diastolic murmur, rubs, gallop, clicks


GI/Abdominal exam: Present: soft, normal bowel sounds.  Absent: distended, 

tenderness, guarding, rebound, rigid


  ** Right


Elbow exam: Present: normal inspection, full ROM.  Absent: tenderness, swelling


Forearm Wrist exam: Absent: normal inspection, full ROM (impaired extension and 

flexion of wrist and fingers on the right hand; appearance of wrist drop), 

tenderness, swelling, deformity, erythema, tenderness over anatomical snuff box


Hand Wrist exam: Absent: deformity, erythema


Neuro motor exam: Absent: wrist extension intact, thumb opposition intact, thumb

IP flexion intact, thumb adduction intact, fingers 2-5 abduction intact


Vascular: Present: normal capillary refill, radial pulse, ulnar pulse.  Absent: 

vascular compromise, Pallo


Psychiatric exam: Present: flat affect


Skin exam: Present: warm, dry, intact, normal color.  Absent: rash





Course


                                   Vital Signs











  11/25/22 11/25/22





  16:50 22:05


 


Temperature 99 F 


 


Pulse Rate 83 80


 


Respiratory 18 16





Rate  


 


Blood Pressure 109/69 133/79


 


O2 Sat by Pulse 97 97





Oximetry  














Medical Decision Making





- Medical Decision Making





32-year-old female with a past medical history of IVDA presents to the emergency

department for evaluation of right wrist drop.  Upon exam patient is answering 

questions appropriately and explains that her wrist has been in this condition 

for the last 3 weeks.  States she shot up and presumes that she hit a nerve.  

Patient has some paresthesia and diminished sensation in the radial surface of 

the affected hand.  No pain or erythema, or evidence of infection.  Decreased 

 strength.  X-ray was obtained and was negative.  Discussed findings with 

patient including the duration of radial nerve palsy.  Instructed on symptomatic

management and encouraged to follow up with orthopedics for recheck.  Return 

parameters discussed in detail.  Patient verbalizes understanding and agrees 

with this plan.  Attending: Puneet.





- Radiology Data


Radiology results: report reviewed, image reviewed


X-ray of the right hand was obtained.  Report was reviewed in its entirety.  

Impression per Dr. Cartagena is no foreign body seen.  No evidence of fracture.


X-ray of the right wrist was obtained.  Report was reviewed in its entirety.  

Impression per Dr. Cartagena is normal right wrist exam.








Disposition


Clinical Impression: 


 Right radial nerve palsy





Disposition: HOME SELF-CARE


Condition: Stable


Instructions (If sedation given, give patient instructions):  Radial Nerve Palsy

(ED)


Additional Instructions: 


Keep affected extremity elevated or propped up on a pillow while at rest.


May take Tylenol or Motrin if needed for discomfort.


This condition takes time to resolve.


Follow-up with your PCP for a recheck next week if needed.


Is patient prescribed a controlled substance at d/c from ED?: No


Referrals: 


None,Stated [Primary Care Provider] - 1-2 days


Time of Disposition: 21:52